# Patient Record
Sex: MALE | URBAN - METROPOLITAN AREA
[De-identification: names, ages, dates, MRNs, and addresses within clinical notes are randomized per-mention and may not be internally consistent; named-entity substitution may affect disease eponyms.]

---

## 2023-05-16 ENCOUNTER — OFFICE VISIT (OUTPATIENT)
Dept: URBAN - METROPOLITAN AREA CLINIC 23 | Facility: CLINIC | Age: 38
End: 2023-05-16
Payer: COMMERCIAL

## 2023-05-16 DIAGNOSIS — H01.004 UNSPECIFIED BLEPHARITIS OF LEFT UPPER EYELID: ICD-10-CM

## 2023-05-16 DIAGNOSIS — H01.001 UNSPECIFIED BLEPARITIS OF RIGHT UPPER EYELID: Primary | ICD-10-CM

## 2023-05-16 PROCEDURE — 99203 OFFICE O/P NEW LOW 30 MIN: CPT | Performed by: OPTOMETRIST

## 2023-05-16 ASSESSMENT — KERATOMETRY
OS: 43.50
OD: 43.38

## 2023-05-16 ASSESSMENT — INTRAOCULAR PRESSURE
OD: 13
OS: 14

## 2023-05-16 NOTE — IMPRESSION/PLAN
Impression: Unspecified bleparitis of right upper eyelid: H01.001. Plan: Pt edu on all findings. Discussed potential causes. Start either [employer] baby shampoo, Ocusoft lid scrubs, Optase Tea Tree Oil lid scrubs or Avenova lid spray daily. Instructed patient to call if symptoms worsen or do not resolve. RTC PRN.